# Patient Record
Sex: FEMALE | URBAN - METROPOLITAN AREA
[De-identification: names, ages, dates, MRNs, and addresses within clinical notes are randomized per-mention and may not be internally consistent; named-entity substitution may affect disease eponyms.]

---

## 2020-08-13 ENCOUNTER — OFFICE VISIT (OUTPATIENT)
Dept: NEUROLOGY | Facility: CLINIC | Age: 40
End: 2020-08-13
Payer: COMMERCIAL

## 2020-08-13 VITALS
RESPIRATION RATE: 16 BRPM | OXYGEN SATURATION: 99 % | DIASTOLIC BLOOD PRESSURE: 66 MMHG | BODY MASS INDEX: 23.3 KG/M2 | WEIGHT: 145 LBS | HEART RATE: 92 BPM | HEIGHT: 66 IN | SYSTOLIC BLOOD PRESSURE: 110 MMHG

## 2020-08-13 DIAGNOSIS — G43.009 MIGRAINE WITHOUT AURA AND WITHOUT STATUS MIGRAINOSUS, NOT INTRACTABLE: Primary | ICD-10-CM

## 2020-08-13 PROCEDURE — 99204 OFFICE O/P NEW MOD 45 MIN: CPT | Performed by: PSYCHIATRY & NEUROLOGY

## 2020-08-13 RX ORDER — AMITRIPTYLINE HYDROCHLORIDE 10 MG/1
10 TABLET, FILM COATED ORAL
Qty: 90 TAB | Refills: 1 | Status: SHIPPED | OUTPATIENT
Start: 2020-08-13 | End: 2021-02-02 | Stop reason: SDUPTHER

## 2020-08-13 NOTE — PROGRESS NOTES
Neurology Consult Note      HISTORY PROVIDED BY: patient    Chief Complaint:   Chief Complaint   Patient presents with    Headache     patient states they are not as bad over the last 2-3 days but it has been a persistent headache. this has been going on for 7-8 months. she states sometimes she may have a headache that may last up to 15 days and varies how intense it is.  Dizziness     patient states she has been having some dizziness but she hasnt had it 2-3 weeks but when it happens it feel like the room is spinning      Numbness     patient states she is having some numbness and tightness in her left hand this occurs every once in a while. Subjective:    Aaron Agarwal is a 42 y.o. right handed female who presents in consultation for headaches. Pt reports onset of migraines in high school, worse in last 5 years. More severe HAs have typically been at onset of periods, over the last 6-8 months she has had HAs that are lasting for two weeks. During these periods she has felt dizzy and off balance to the point she felt uncomfortable walking her dog. Pain is posterior occipital region, may occur behind eyes if she is on computer too long, throbbing, no N/V, +photophobia, blurry vision when severe and dizzy. Frequency varies, may go away for a month or only a few days. Taking OTC meds for abortive therapy. No prevention meds. Drinks 6-7 glasses of water a day. Drinks one cup of coffee a day. Exercises 3 days a week. +Family h/o migraines in Mom. Father with PD, stroke, 77yo. At night, she has had a feeling in her left leg that makes her have to get up and walk around the block, typically in left thigh, feels like it is jerking, but not visible. This has been off and on for a couple of years. Has not been treated for RLS. Left hand has felt numb and achy, happened a couple of times in last six months, not persistent and not associated with HA.      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   Social Needs    Financial resource strain: Not on file    Food insecurity     Worry: Not on file     Inability: Not on file    Transportation needs     Medical: Not on file     Non-medical: Not on file   Tobacco Use    Smoking status: Former Smoker     Packs/day: 0.25     Last attempt to quit: 2019     Years since quittin.6    Smokeless tobacco: Never Used   Substance and Sexual Activity    Alcohol use:  Yes     Alcohol/week: 2.0 - 3.0 standard drinks     Types: 2 - 3 Glasses of wine per week    Drug use: No    Sexual activity: Not on file   Lifestyle    Physical activity     Days per week: Not on file     Minutes per session: Not on file    Stress: Not on file   Relationships    Social connections     Talks on phone: Not on file     Gets together: Not on file     Attends Buddhist service: Not on file     Active member of club or organization: Not on file     Attends meetings of clubs or organizations: Not on file     Relationship status: Not on file    Intimate partner violence     Fear of current or ex partner: Not on file     Emotionally abused: Not on file     Physically abused: Not on file     Forced sexual activity: Not on file   Other Topics Concern    Not on file   Social History Narrative    Lives in [facility] alone     Family History   Problem Relation Age of Onset    Arthritis-rheumatoid Mother     Migraines Mother     Cancer Maternal Grandmother     Stroke Maternal Grandfather     Diabetes Maternal Grandfather     Heart Disease Paternal Grandmother     Heart Disease Paternal Grandfather     Parkinson's Disease Father     Stroke Father     Prostate Cancer Father     No Known Problems Brother     No Known Problems Brother          Objective:   Review of Systems   Constitutional: Positive for malaise/fatigue. HENT: Negative. Eyes: Positive for blurred vision. Respiratory: Negative. Cardiovascular: Negative. Gastrointestinal: Negative. Genitourinary: Negative. Musculoskeletal: Negative. Skin: Negative. Neurological: Positive for weakness and headaches. Endo/Heme/Allergies: Negative. Psychiatric/Behavioral: The patient is nervous/anxious. No Known Allergies     Meds:  Outpatient Medications Prior to Visit   Medication Sig Dispense Refill    ethynodiol-ethinyl estradiol (Ivanna Truong 1/35, 28,) 1-35 mg-mcg per tablet take 1 Tab by mouth daily.  alprazolam (XANAX) 0.5 mg tablet take 1 Tab by mouth nightly as needed for Sleep. 30 Tab 1     No facility-administered medications prior to visit. Imaging:  MRI Results (most recent):  No results found for this or any previous visit. CT Results (most recent):  No results found for this or any previous visit. Reviewed records in [employer] tab today    Lab Review   Results for orders placed or performed in visit on 07/11/09   CBC W/ AUTOMATED DIFF   Result Value Ref Range    WBC 9.2 3.6 - 11.0 K/uL    RBC 4.13 3.80 - 5.20 M/uL    HGB 13.5 11.5 - 16.0 g/dL    HCT 40.2 35.0 - 47.0 %    MCV 97.3 80.0 - 99.0 FL    MCH 32.7 26.0 - 34.0 PG    MCHC 33.6 30.0 - 35.0 g/dL    RDW 14.0 11.5 - 14.5 %    PLATELET 402 295 - 997 K/uL    NEUTROPHILS 71 32 - 75 %    LYMPHOCYTES 22 12 - 49 %    MONOCYTES 6 5 - 13 %    EOSINOPHILS 1 0 - 7 %    BASOPHILS 0 0 - 1 %    ABS. NEUTROPHILS 6.5 1.8 - 8.0 K/UL    ABS. LYMPHOCYTES 2.0 0.8 - 3.5 K/UL    ABS. MONOCYTES 0.6 0 - 1.0 K/UL    ABS. EOSINOPHILS 0.1 0.0 - 0.4 K/UL    ABS.  BASOPHILS 0.0 0.0 - 0.1 K/UL   IRON PROFILE   Result Value Ref Range    Iron 89 35 - 150 ug/dL    TIBC 411 250 - 450 ug/dL    Iron % saturation 22 20 - 50 %   METABOLIC PANEL, COMPREHENSIVE   Result Value Ref Range    Sodium 134 (L) 136 - 145 MMOL/L    Potassium 3.9 3.5 - 5.1 MMOL/L    Chloride 101 97 - 108 MMOL/L    CO2 26 21 - 32 MMOL/L    Anion gap 7 5 - 15 mmol/L    Glucose 83 50 - 100 MG/DL    BUN 13 6 - 20 MG/DL    Creatinine 0.7 0.6 - 1.3 MG/DL    BUN/Creatinine ratio 19 12 - 20      GFR est AA >60 >60 ml/min/1.73m2    GFR est non-AA >60 >60 ml/min/1.73m2    Calcium 9.1 8.5 - 10.1 MG/DL    Bilirubin, total 0.3 <1.0 MG/DL    ALT (SGPT) 27 (L) 30 - 65 U/L    AST (SGOT) 16 15 - 37 U/L    Alk. phosphatase 68 50 - 136 U/L    Protein, total 7.3 6.4 - 8.2 g/dL    Albumin 4.0 3.5 - 5.0 g/dL    Globulin 3.3 2.0 - 4.0 g/dL    A-G Ratio 1.2 1.1 - 2.2          Exam:  Visit Vitals  /66 (BP 1 Location: Left arm, BP Patient Position: Sitting)   Pulse 92   Resp 16   Ht 5' 6\" (1.676 m)   Wt 65.8 kg (145 lb)   SpO2 99%   BMI 23.40 kg/m²     General:  Alert, cooperative, no distress. Head:  Normocephalic, without obvious abnormality, atraumatic. Respiratory:  Heart:   Non labored breathing  Regular rate and rhythm, no murmurs   Neck:   2+ carotids, no bruits   Extremities: Warm, no cyanosis or edema. Pulses: 2+ radial pulses. Neurologic:  MS: Alert and oriented x 4, speech intact. Language intact. Attention and fund of knowledge appropriate. Recent and remote memory intact.   Cranial Nerves:  II: visual fields Full to confrontation   II: pupils Equal, round, reactive to light   II: optic disc    III,VII: ptosis none   III,IV,VI: extraocular muscles  EOMI, no nystagmus or diplopia   V: facial light touch sensation  normal   VII: facial muscle function   symmetric   VIII: hearing intact   IX: soft palate elevation  normal   XI: trapezius strength  5/5   XI: sternocleidomastoid strength 5/5   XII: tongue  Midline     Motor: normal bulk and tone, no tremor              Strength: 5/5 throughout, no PD  Sensory: intact to LT, PP  Coordination: FTN and HTS intact, VIDHI intact  Gait: normal gait, able to tandem walk  Reflexes: 2+ symmetric, toes downgoing     Assessment/Plan   Pt is a 42 y.o. right handed female with onset of migraines in high school, worse in last 5 years. More severe HAs have typically been at onset of periods, over the last 6-8 months HAs are lasting for two weeks, vary in frequency, and associated with dizziness and imbalance. Throbbing pain in posterior occipital region, may occur behind eyes, +photophobia, blurry vision when severe. Taking OTC meds for abortive therapy. Additionally, c/o feeling in her left leg causing her to be unable to sleep and feels the urge to move it. Exam is non-focal and unremarkable. Agree with diagnosis of migraine headache without aura. Recommend starting a MHA prevention medication, amitriptyline 10mg qhs, side effects reviewed. Continue OTC meds for abortive therapy for now. Discussed possible RLS, will see if sleep improves with amitriptyline, may need to start medication for RLS if not. Encouraged to increase water intake to 8 glasses a day. F/u in clinic in 3 months, instructed to call in the interim if needed. ICD-10-CM ICD-9-CM    1. Migraine without aura and without status migrainosus, not intractable  G43.009 346.10        Signed:   Robert Browne MD  8/13/2020

## 2020-08-13 NOTE — PROGRESS NOTES
patient states they are not as bad over the last 2-3 days but it has been a persistent headache. this has been going on for 7-8 months. she states sometimes she may have a headache that may last up to 15 days and varies how intense it is.patient states she has been having some dizziness but she hasnt had it 2-3 weeks but when it happens it feel like the room is spinning. patient states she is having some numbness and tightness in her left hand this occurs every once in a while. .  Chief Complaint   Patient presents with    Headache     patient states they are not as bad over the last 2-3 days but it has been a persistent headache. this has been going on for 7-8 months. she states sometimes she may have a headache that may last up to 15 days and varies how intense it is.  Dizziness     patient states she has been having some dizziness but she hasnt had it 2-3 weeks but when it happens it feel like the room is spinning      Numbness     patient states she is having some numbness and tightness in her left hand this occurs every once in a while.      .  Visit Vitals  /66 (BP 1 Location: Left arm, BP Patient Position: Sitting)   Pulse 92   Resp 16   Ht 5' 6\" (1.676 m)   Wt 145 lb (65.8 kg)   SpO2 99%   BMI 23.40 kg/m²

## 2020-11-17 ENCOUNTER — TELEPHONE (OUTPATIENT)
Dept: NEUROLOGY | Age: 40
End: 2020-11-17

## 2020-11-17 NOTE — TELEPHONE ENCOUNTER
Pt calling back to r/s appt for 11/19. Please advise on where to schedule.  Next available is in January

## 2020-11-19 ENCOUNTER — TELEPHONE (OUTPATIENT)
Dept: NEUROLOGY | Age: 40
End: 2020-11-19

## 2021-02-02 ENCOUNTER — OFFICE VISIT (OUTPATIENT)
Dept: NEUROLOGY | Age: 41
End: 2021-02-02
Payer: COMMERCIAL

## 2021-02-02 VITALS
OXYGEN SATURATION: 98 % | BODY MASS INDEX: 23.08 KG/M2 | RESPIRATION RATE: 18 BRPM | HEART RATE: 117 BPM | WEIGHT: 143 LBS

## 2021-02-02 DIAGNOSIS — G43.009 MIGRAINE WITHOUT AURA AND WITHOUT STATUS MIGRAINOSUS, NOT INTRACTABLE: Primary | ICD-10-CM

## 2021-02-02 PROCEDURE — 99213 OFFICE O/P EST LOW 20 MIN: CPT | Performed by: PSYCHIATRY & NEUROLOGY

## 2021-02-02 RX ORDER — AMITRIPTYLINE HYDROCHLORIDE 10 MG/1
20 TABLET, FILM COATED ORAL
Qty: 180 TAB | Refills: 1 | Status: SHIPPED | OUTPATIENT
Start: 2021-02-02 | End: 2021-08-10

## 2021-02-02 RX ORDER — ONDANSETRON 4 MG/1
4 TABLET, ORALLY DISINTEGRATING ORAL
Qty: 30 TAB | Refills: 0 | Status: SHIPPED | OUTPATIENT
Start: 2021-02-02

## 2021-02-02 RX ORDER — RIZATRIPTAN BENZOATE 10 MG/1
TABLET, ORALLY DISINTEGRATING ORAL
Qty: 27 TAB | Refills: 3 | Status: SHIPPED | OUTPATIENT
Start: 2021-02-02

## 2021-02-02 NOTE — PROGRESS NOTES
Neurology Consult Note      HISTORY PROVIDED BY: patient    Chief Complaint:   Chief Complaint   Patient presents with    Migraine      Subjective:   Pt is a 42 y.o. right handed female initially and last seen on 8/13/20 with onset of migraines in high school, worse in last 5 years. More severe HAs have typically been at onset of periods, over the last 6-8 months HAs are lasting for two weeks, vary in frequency, and associated with dizziness and imbalance. Throbbing pain in posterior occipital region, may occur behind eyes, +photophobia, blurry vision when severe. Taking OTC meds for abortive therapy. Additionally, c/o feeling in her left leg causing her to be unable to sleep and feels the urge to move it. Exam was non-focal and unremarkable. Agree with diagnosis of migraine headache without aura. Recommended starting a MHA prevention medication, amitriptyline 10mg qhs. Continued OTC meds for abortive therapy for now. Discussed possible RLS, will see if sleep improves with amitriptyline, may need to start medication for RLS if not. Encouraged to increase water intake to 8 glasses a day. She returns for f/u. Her RLS have been almost completely resolved. MHAs are better, occurring once a month, lasting for a shorter time, typically 2.5 days. Last one was 3.5 weeks ago and lasted 4 days with N/V. She is using ibuprofen for abortive therapy, but not helping. She tried Imitrex in past, but had side effect of her chest tightening.       Past Medical History:   Diagnosis Date    Anxiety     Migraine     monthly w/ cycle      Past Surgical History:   Procedure Laterality Date    HX WISDOM TEETH EXTRACTION        Social History     Socioeconomic History    Marital status: SINGLE     Spouse name: Not on file    Number of children: Not on file    Years of education: Not on file    Highest education level: Not on file   Occupational History    Occupation:  for [address] Financial resource strain: Not on file    Food insecurity     Worry: Not on file     Inability: Not on file    Transportation needs     Medical: Not on file     Non-medical: Not on file   Tobacco Use    Smoking status: Former Smoker     Packs/day: 0.25     Quit date:      Years since quittin.0    Smokeless tobacco: Never Used   Substance and Sexual Activity    Alcohol use: Yes     Alcohol/week: 2.0 - 3.0 standard drinks     Types: 2 - 3 Glasses of wine per week    Drug use: No    Sexual activity: Not on file   Lifestyle    Physical activity     Days per week: Not on file     Minutes per session: Not on file    Stress: Not on file   Relationships    Social connections     Talks on phone: Not on file     Gets together: Not on file     Attends Hindu service: Not on file     Active member of club or organization: Not on file     Attends meetings of clubs or organizations: Not on file     Relationship status: Not on file    Intimate partner violence     Fear of current or ex partner: Not on file     Emotionally abused: Not on file     Physically abused: Not on file     Forced sexual activity: Not on file   Other Topics Concern    Not on file   Social History Narrative    Lives in [facility] alone     Family History   Problem Relation Age of Onset    Arthritis-rheumatoid Mother     Migraines Mother     Cancer Maternal Grandmother     Stroke Maternal Grandfather     Diabetes Maternal Grandfather     Heart Disease Paternal Grandmother     Heart Disease Paternal Grandfather     Parkinson's Disease Father     Stroke Father     Prostate Cancer Father     No Known Problems Brother     No Known Problems Brother          Objective:   ROS: Per HPI o/w reviewed and neg    No Known Allergies     Meds:  Outpatient Medications Prior to Visit   Medication Sig Dispense Refill    amitriptyline (ELAVIL) 10 mg tablet Take 1 Tab by mouth nightly.  90 Tab 1    ethynodiol-ethinyl estradiol (Kane Shows 1/35, 28,) 1-35 mg-mcg per tablet take 1 Tab by mouth daily.  alprazolam (XANAX) 0.5 mg tablet take 1 Tab by mouth nightly as needed for Sleep. 30 Tab 1     No facility-administered medications prior to visit. Imaging:  MRI Results (most recent):  No results found for this or any previous visit. CT Results (most recent):  No results found for this or any previous visit. Reviewed records in [employer] tab today    Lab Review   Results for orders placed or performed in visit on 07/11/09   CBC W/ AUTOMATED DIFF   Result Value Ref Range    WBC 9.2 3.6 - 11.0 K/uL    RBC 4.13 3.80 - 5.20 M/uL    HGB 13.5 11.5 - 16.0 g/dL    HCT 40.2 35.0 - 47.0 %    MCV 97.3 80.0 - 99.0 FL    MCH 32.7 26.0 - 34.0 PG    MCHC 33.6 30.0 - 35.0 g/dL    RDW 14.0 11.5 - 14.5 %    PLATELET 077 197 - 812 K/uL    NEUTROPHILS 71 32 - 75 %    LYMPHOCYTES 22 12 - 49 %    MONOCYTES 6 5 - 13 %    EOSINOPHILS 1 0 - 7 %    BASOPHILS 0 0 - 1 %    ABS. NEUTROPHILS 6.5 1.8 - 8.0 K/UL    ABS. LYMPHOCYTES 2.0 0.8 - 3.5 K/UL    ABS. MONOCYTES 0.6 0 - 1.0 K/UL    ABS. EOSINOPHILS 0.1 0.0 - 0.4 K/UL    ABS. BASOPHILS 0.0 0.0 - 0.1 K/UL   IRON PROFILE   Result Value Ref Range    Iron 89 35 - 150 ug/dL    TIBC 411 250 - 450 ug/dL    Iron % saturation 22 20 - 50 %   METABOLIC PANEL, COMPREHENSIVE   Result Value Ref Range    Sodium 134 (L) 136 - 145 MMOL/L    Potassium 3.9 3.5 - 5.1 MMOL/L    Chloride 101 97 - 108 MMOL/L    CO2 26 21 - 32 MMOL/L    Anion gap 7 5 - 15 mmol/L    Glucose 83 50 - 100 MG/DL    BUN 13 6 - 20 MG/DL    Creatinine 0.7 0.6 - 1.3 MG/DL    BUN/Creatinine ratio 19 12 - 20      GFR est AA >60 >60 ml/min/1.73m2    GFR est non-AA >60 >60 ml/min/1.73m2    Calcium 9.1 8.5 - 10.1 MG/DL    Bilirubin, total 0.3 <1.0 MG/DL    ALT (SGPT) 27 (L) 30 - 65 U/L    AST (SGOT) 16 15 - 37 U/L    Alk.  phosphatase 68 50 - 136 U/L    Protein, total 7.3 6.4 - 8.2 g/dL    Albumin 4.0 3.5 - 5.0 g/dL    Globulin 3.3 2.0 - 4.0 g/dL    A-G Ratio 1.2 1.1 - 2.2          Exam:  Visit Vitals  Pulse (!) 117   Resp 18   Wt 143 lb (64.9 kg)   SpO2 98%   BMI 23.08 kg/m²     General:  Alert, cooperative, no distress. Head:  Normocephalic, without obvious abnormality, atraumatic. Respiratory:  Heart:   Non labored breathing  Regular rate and rhythm, no murmurs   Neck:      Extremities: Warm, no cyanosis or edema. Pulses: 2+ radial pulses. Neurologic:  MS: Alert and oriented x 4, speech intact. Language intact. Attention and fund of knowledge appropriate. Recent and remote memory intact. Exam 8/13/20:  Cranial Nerves:  II: visual fields Full to confrontation   II: pupils Equal, round, reactive to light   II: optic disc    III,VII: ptosis none   III,IV,VI: extraocular muscles  EOMI, no nystagmus or diplopia   V: facial light touch sensation  normal   VII: facial muscle function   symmetric   VIII: hearing intact   IX: soft palate elevation  normal   XI: trapezius strength  5/5   XI: sternocleidomastoid strength 5/5   XII: tongue  Midline     Motor: normal bulk and tone, no tremor              Strength: 5/5 throughout, no PD  Sensory: intact to LT, PP  Coordination: FTN and HTS intact, VIDHI intact  Gait: normal gait, able to tandem walk  Reflexes: 2+ symmetric, toes downgoing     Assessment/Plan   Pt is a 42 y.o. right handed female with onset of migraine without aura in high school, presenting in Aug, 2020 with c/o worsening in last 5 years. More severe HAs have typically been at onset of periods, over the last 6-8 months HAs are lasting for two weeks, vary in frequency, and associated with dizziness and imbalance. Throbbing pain in posterior occipital region, may occur behind eyes, +photophobia, blurry vision when severe. Now still once a month, but lasting only 2-3 days typically, not responding to ibuprofen. Additionally, c/o feeling in her left leg causing her to be unable to sleep and feels the urge to move it, suggestive of RLS, now improved. Exam was non-focal and unremarkable. Increase  amitriptyline to 20mg qhs. Start Maxalt MLT for abortive therapy. Start Zofran 4mg for nausea. Suggested calling for a medrol dose pack if HA is lasting into the 3rd day. F/u in clinic in 6 months, instructed to call in the interim if needed. ICD-10-CM ICD-9-CM    1. Migraine without aura and without status migrainosus, not intractable  G43.009 346.10        Signed:   Joey Naranjo MD  2/2/2021

## 2021-02-02 NOTE — PATIENT INSTRUCTIONS
PRESCRIPTION REFILL POLICY [facility] Statement to Patients April 1, 2014 In an effort to ensure the large volume of patient prescription refills is processed in the most efficient and expeditious manner, we are asking our patients to assist us by calling your Pharmacy for all prescription refills, this will include also your  Mail Order Pharmacy. The pharmacy will contact our office electronically to continue the refill process. Please do not wait until the last minute to call your pharmacy. We need at least 48 hours (2days) to fill prescriptions. We also encourage you to call your pharmacy before going to  your prescription to make sure it is ready. With regard to controlled substance prescription refill requests (narcotic refills) that need to be picked up at our office, we ask your cooperation by providing us with at least 72 hours (3days) notice that you will need a refill. We will not refill narcotic prescription refill requests after 4:00pm on any weekday, Monday through Thursday, or after 2:00pm on Fridays, or on the weekends. We encourage everyone to explore another way of getting your prescription refill request processed using [employer], our patient web portal through our electronic medical record system. [employer] is an efficient and effective way to communicate your medication request directly to the office and  downloadable as an elkin on your smart phone . [employer] also features a review functionality that allows you to view your medication list as well as leave messages for your physician. Are you ready to get connected? If so please review the attatched instructions or speak to any of our staff to get you set up right away! Thank you so much for your cooperation. Should you have any questions please contact our Practice Administrator. The Physicians and Staff,  [facility]

## 2021-02-02 NOTE — LETTER
2/2/2021 Patient: Talat Ricks YOB: 1980 Date of Visit: 2/2/2021 Shavonne Jordan MD 
[facility] [address] [id number] Via Fax: [phone] Dear Shavonne Jordan MD, Thank you for referring Ms. Talat Ricks to [address] [address] for evaluation. My notes for this consultation are attached. If you have questions, please do not hesitate to call me. I look forward to following your patient along with you. Sincerely, Yara Ramírez MD

## 2021-08-10 RX ORDER — AMITRIPTYLINE HYDROCHLORIDE 10 MG/1
TABLET, FILM COATED ORAL
Qty: 180 TABLET | Refills: 0 | Status: SHIPPED | OUTPATIENT
Start: 2021-08-10 | End: 2021-10-31

## 2021-10-31 RX ORDER — AMITRIPTYLINE HYDROCHLORIDE 10 MG/1
TABLET, FILM COATED ORAL
Qty: 180 TABLET | Refills: 1 | Status: SHIPPED | OUTPATIENT
Start: 2021-10-31 | End: 2022-01-20 | Stop reason: SINTOL

## 2022-01-20 ENCOUNTER — OFFICE VISIT (OUTPATIENT)
Dept: NEUROLOGY | Age: 42
End: 2022-01-20
Payer: COMMERCIAL

## 2022-01-20 VITALS
SYSTOLIC BLOOD PRESSURE: 120 MMHG | WEIGHT: 142 LBS | OXYGEN SATURATION: 99 % | DIASTOLIC BLOOD PRESSURE: 80 MMHG | HEIGHT: 66 IN | HEART RATE: 146 BPM | BODY MASS INDEX: 22.82 KG/M2

## 2022-01-20 DIAGNOSIS — G43.009 MIGRAINE WITHOUT AURA AND WITHOUT STATUS MIGRAINOSUS, NOT INTRACTABLE: Primary | ICD-10-CM

## 2022-01-20 DIAGNOSIS — G25.81 RLS (RESTLESS LEGS SYNDROME): ICD-10-CM

## 2022-01-20 PROCEDURE — 99214 OFFICE O/P EST MOD 30 MIN: CPT | Performed by: PSYCHIATRY & NEUROLOGY

## 2022-01-20 RX ORDER — GABAPENTIN 300 MG/1
300 CAPSULE ORAL
Qty: 90 CAPSULE | Refills: 1 | Status: SHIPPED | OUTPATIENT
Start: 2022-01-20 | End: 2022-07-18

## 2022-01-20 NOTE — LETTER
2/7/2022    Patient: Annette Keating   YOB: 1980   Date of Visit: 1/20/2022     Katina Medina MD  [address] [place] [id number]  Via Fax: [phone]    Dear Katina Medina MD,      Thank you for referring Ms. Annette Keating to [address] [place] [address] for evaluation. My notes for this consultation are attached. If you have questions, please do not hesitate to call me. I look forward to following your patient along with you.       Sincerely,    Greg Noonan MD

## 2022-01-20 NOTE — PROGRESS NOTES
Neurology Consult Note      HISTORY PROVIDED BY: patient    Chief Complaint:   Chief Complaint   Patient presents with    Follow-up    Migraine     RLS      Subjective:   Pt is a 46 y.o. right handed female last seen in clinic on 2/2/21 in f/u, she had onset of migraine without aura in high school, presenting in Aug, 2020 with c/o worsening in previous 5 years. More severe HAs have typically been at onset of periods, over the previous 6-8 months HAs were lasting for two weeks, vary in frequency, and associated with dizziness and imbalance. Throbbing pain in posterior occipital region, may occur behind eyes, +photophobia, blurry vision when severe. Once a month at last visit, but lasting 2-3 days typically, not responding to ibuprofen. Additionally, c/o feeling in her left leg causing her to be unable to sleep and feels the urge to move it, suggestive of RLS, improved. Exam was non-focal and unremarkable. Increased  amitriptyline to 20mg qhs. Started Maxalt MLT for abortive therapy. Started Zofran 4mg for nausea. Suggested calling for a medrol dose pack if HA is lasting into the 3rd day. She returns for f/u. Her MHAs are well controlled since increasing amitriptyline. She has only used Maxalt x 2. Her RLS will bother her for 3-4 nights in row, then may not bother her for a week or two. No particular pattern. Her PCP has diagnosed her with CTS, but she has also noticed tingling feeling in right foot, but has resolved. No numbness.      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   Tobacco Use    Smoking status: Former Smoker     Packs/day: 0.25     Quit date: 2019     Years since quitting: 3.0    Smokeless tobacco: Never Used   Substance and Sexual Activity    Alcohol use: Yes     Alcohol/week: 2.0 - 3.0 standard drinks     Types: 2 - 3 Glasses of wine per week    Drug use: No    Sexual activity: Not on file   Other Topics Concern    Not on file   Social History Narrative    Lives in [place]     Social Determinants of Health     Financial Resource Strain:     Difficulty of Paying Living Expenses: Not on file   Food Insecurity:     Worried About Running Out of Food in the Last Year: Not on file    Howie of Food in the Last Year: Not on file   Transportation Needs:     Lack of Transportation (Medical): Not on file    Lack of Transportation (Non-Medical):  Not on file   Physical Activity:     Days of Exercise per Week: Not on file    Minutes of Exercise per Session: Not on file   Stress:     Feeling of Stress : Not on file   Social Connections:     Frequency of Communication with Friends and Family: Not on file    Frequency of Social Gatherings with Friends and Family: Not on file    Attends [employer] Services: Not on file    Active Member of [address] [place] [address] [employer] or Organizations: Not on file    Attends Club or Organization Meetings: Not on file    Marital Status: Not on file   Intimate Partner Violence:     Fear of Current or Ex-Partner: Not on file    Emotionally Abused: Not on file    Physically Abused: Not on file    Sexually Abused: Not on file   Housing Stability:     Unable to Pay for Housing in the Last Year: Not on file    Number of [place] in the Last Year: Not on file    Unstable Housing in the Last Year: Not on file     Family History   Problem Relation Age of Onset    Arthritis-rheumatoid Mother     Migraines Mother     Cancer Maternal Grandmother     Stroke Maternal Grandfather     Diabetes Maternal Grandfather     Heart Disease Paternal Grandmother    [facility] Heart Disease Paternal Grandfather     Parkinson's Disease Father     Stroke Father     Prostate Cancer Father     No Known Problems Brother     No Known Problems Brother          Objective:   ROS: Per HPI o/w reviewed and neg    No Known Allergies     Meds:  Outpatient Medications Prior to Visit   Medication Sig Dispense Refill    amitriptyline (ELAVIL) 10 mg tablet TAKE 2 TABLETS BY MOUTH EVERY NIGHT 180 Tablet 1    rizatriptan (MAXALT-MLT) 10 mg disintegrating tablet May take 1 tab by mouth at onset of migraine, may repeat x 1 after 2 hours if needed for ongoing headache, max 2 in 24 hours 27 Tab 3    ethynodiol-ethinyl estradiol ([facility] 1/35, 28,) 1-35 mg-mcg per tablet take 1 Tab by mouth daily.  alprazolam (XANAX) 0.5 mg tablet take 1 Tab by mouth nightly as needed for Sleep. 30 Tab 1    ondansetron (ZOFRAN ODT) 4 mg disintegrating tablet Take 1 Tab by mouth every eight (8) hours as needed for Nausea or Vomiting. (Patient not taking: Reported on 1/20/2022) 30 Tab 0     No facility-administered medications prior to visit. Imaging:  MRI Results (most recent):  No results found for this or any previous visit. CT Results (most recent):  No results found for this or any previous visit. Reviewed records in [facility] and [facility] [facility] [employer] tab today    Lab Review   Results for orders placed or performed in visit on 07/11/09   CBC W/ AUTOMATED DIFF   Result Value Ref Range    WBC 9.2 3.6 - 11.0 K/uL    RBC 4.13 3.80 - 5.20 M/uL    HGB 13.5 11.5 - 16.0 g/dL    HCT 40.2 35.0 - 47.0 %    MCV 97.3 80.0 - 99.0 FL    MCH 32.7 26.0 - 34.0 PG    MCHC 33.6 30.0 - 35.0 g/dL    RDW 14.0 11.5 - 14.5 %    PLATELET 261 119 - 148 K/uL    NEUTROPHILS 71 32 - 75 %    LYMPHOCYTES 22 12 - 49 %    MONOCYTES 6 5 - 13 %    EOSINOPHILS 1 0 - 7 %    BASOPHILS 0 0 - 1 %    ABS. NEUTROPHILS 6.5 1.8 - 8.0 K/UL    ABS. LYMPHOCYTES 2.0 0.8 - 3.5 K/UL    ABS. MONOCYTES 0.6 0 - 1.0 K/UL    ABS.  EOSINOPHILS 0.1 0.0 - 0.4 K/UL ABS. BASOPHILS 0.0 0.0 - 0.1 K/UL   IRON PROFILE   Result Value Ref Range    Iron 89 35 - 150 ug/dL    TIBC 411 250 - 450 ug/dL    Iron % saturation 22 20 - 50 %   METABOLIC PANEL, COMPREHENSIVE   Result Value Ref Range    Sodium 134 (L) 136 - 145 MMOL/L    Potassium 3.9 3.5 - 5.1 MMOL/L    Chloride 101 97 - 108 MMOL/L    CO2 26 21 - 32 MMOL/L    Anion gap 7 5 - 15 mmol/L    Glucose 83 50 - 100 MG/DL    BUN 13 6 - 20 MG/DL    Creatinine 0.7 0.6 - 1.3 MG/DL    BUN/Creatinine ratio 19 12 - 20      GFR est AA >60 >60 ml/min/1.73m2    GFR est non-AA >60 >60 ml/min/1.73m2    Calcium 9.1 8.5 - 10.1 MG/DL    Bilirubin, total 0.3 <1.0 MG/DL    ALT (SGPT) 27 (L) 30 - 65 U/L    AST (SGOT) 16 15 - 37 U/L    Alk. phosphatase 68 50 - 136 U/L    Protein, total 7.3 6.4 - 8.2 g/dL    Albumin 4.0 3.5 - 5.0 g/dL    Globulin 3.3 2.0 - 4.0 g/dL    A-G Ratio 1.2 1.1 - 2.2          Exam:  Visit Vitals  /80   Pulse (!) 146   Ht 5' 6\" (1.676 m)   Wt 142 lb (64.4 kg)   SpO2 99%   BMI 22.92 kg/m²     General:  Alert, cooperative, no distress. Head:  Normocephalic, without obvious abnormality, atraumatic. Respiratory:  Heart:   Non labored breathing  Regular rate and rhythm, no murmurs   Neck:      Extremities: Warm, no cyanosis or edema. Pulses: 2+ radial pulses. Neurologic:  MS: Alert and oriented x 4, speech intact. Language intact. Attention and fund of knowledge appropriate. Recent and remote memory intact.     Cranial Nerves:  II: visual fields    II: pupils    II: optic disc    III,VII: ptosis none   III,IV,VI: extraocular muscles  EOMI, no nystagmus or diplopia   V: facial light touch sensation     VII: facial muscle function   symmetric   VIII: hearing intact   IX: soft palate elevation     XI: trapezius strength     XI: sternocleidomastoid strength    XII: tongue       Motor: normal bulk and tone, no tremor              Strength: 5/5 throughout, no PD  Sensory: (At previous OV: intact to LT, PP)  Coordination: FTN and HTS intact, VIDHI intact  Gait: normal gait  Reflexes: (At previous OV: 2+ symmetric, toes downgoing)     Assessment/Plan   Pt is a 46 y.o. right handed female with onset of migraine without aura in high school, presenting in Aug, 2020 with c/o worsening in previous 5 years. More severe HAs have typically been at onset of periods, over the previous 6-8 months HAs were lasting for two weeks, vary in frequency, and associated with dizziness and imbalance. Throbbing pain in posterior occipital region, may occur behind eyes, +photophobia, blurry vision when severe. MHAs are well controlled on amitriptyline 20mg qhs, taking Maxalt for abortive therapy. Additionally, c/o feeling in her left leg causing her to be unable to sleep and feels the urge to move it, suggestive of RLS, still occurring intermttently. Exam is non-focal and unremarkable. Continue amitriptyline to 20mg qhs. Continue Maxalt MLT for abortive therapy. Continue Zofran 4mg for nausea. Start gabapentin 300mg qhs for RLS, side effects reviewed. F/u in clinic in 4 months, instructed to call in the interim if needed. ICD-10-CM ICD-9-CM    1. Migraine without aura and without status migrainosus, not intractable  G43.009 346.10    2. RLS (restless legs syndrome)  G25.81 333.94 gabapentin (NEURONTIN) 300 mg capsule       Signed:   Key Joe MD  1/20/2022

## 2022-07-17 DIAGNOSIS — G25.81 RLS (RESTLESS LEGS SYNDROME): ICD-10-CM

## 2022-07-18 RX ORDER — GABAPENTIN 300 MG/1
CAPSULE ORAL
Qty: 90 CAPSULE | Refills: 0 | Status: SHIPPED | OUTPATIENT
Start: 2022-07-18 | End: 2022-10-19

## 2022-10-17 DIAGNOSIS — G25.81 RLS (RESTLESS LEGS SYNDROME): ICD-10-CM

## 2022-10-19 RX ORDER — GABAPENTIN 300 MG/1
CAPSULE ORAL
Qty: 90 CAPSULE | Refills: 1 | Status: SHIPPED | OUTPATIENT
Start: 2022-10-19

## 2022-10-19 NOTE — TELEPHONE ENCOUNTER
Pt no showed her fu appt in June, 2021. She has a fu with Dr. Noble Velarde in April, 2023. Last gabapentin refill without being seen.

## 2023-04-05 ENCOUNTER — OFFICE VISIT (OUTPATIENT)
Dept: NEUROLOGY | Age: 43
End: 2023-04-05
Payer: COMMERCIAL

## 2023-04-05 PROCEDURE — 99214 OFFICE O/P EST MOD 30 MIN: CPT | Performed by: PSYCHIATRY & NEUROLOGY

## 2023-04-05 RX ORDER — RIZATRIPTAN BENZOATE 10 MG/1
10 TABLET ORAL
Qty: 9 TABLET | Refills: 2 | Status: SHIPPED
Start: 2023-04-05 | End: 2023-04-05

## 2023-04-05 RX ORDER — GABAPENTIN 300 MG/1
600 CAPSULE ORAL
Qty: 180 CAPSULE | Refills: 1 | Status: SHIPPED
Start: 2023-04-05

## 2023-04-05 RX ORDER — FLUTICASONE PROPIONATE AND SALMETEROL 250; 50 UG/1; UG/1
POWDER RESPIRATORY (INHALATION)
Start: 2023-03-23

## 2023-04-05 NOTE — PROGRESS NOTES
Neurology Clinic Follow up Note    Patient ID:  Arlette Tucker  [id number]  38 y.o.  1980      Ms. Wellington is here for follow up today of  Chief Complaint   Patient presents with    Other     Migraines and RLS  , Migraines are much better on gabapentin  and RLS not improved at least 1 or 2 nights in a row then a week later will happen again. Former Pt of Christopher Dixon          Last Appointment With Me:  None, former pt Dr. Christopher Dixon    \"Pt is a right handed female last seen in clinic on 2/2/21 in f/u, she had onset of migraine without aura in high school, presenting in Aug, 2020 with c/o worsening in previous 5 years. More severe HAs have typically been at onset of periods, over the previous 6-8 months HAs were lasting for two weeks, vary in frequency, and associated with dizziness and imbalance. Throbbing pain in posterior occipital region, may occur behind eyes, +photophobia, blurry vision when severe. Once a month at last visit, but lasting 2-3 days typically, not responding to ibuprofen. Additionally, c/o feeling in her left leg causing her to be unable to sleep and feels the urge to move it, suggestive of RLS, improved. Exam was non-focal and unremarkable. Increased  amitriptyline to 20mg qhs. Started Maxalt MLT for abortive therapy. Started Zofran 4mg for nausea. Suggested calling for a medrol dose pack if HA is lasting into the 3rd day. She returns for f/u. Her MHAs are well controlled since increasing amitriptyline. She has only used Maxalt x 2. Her RLS will bother her for 3-4 nights in row, then may not bother her for a week or two. No particular pattern. Her PCP has diagnosed her with CTS, but she has also noticed tingling feeling in right foot, but has resolved. No numbness. \"  Interval History:   Patient last seen over 1 year ago for her migraines and RLS. She reports onset of migraines since her teenage years.  She was maintained on amitriptyline previously but did not note significant change in her headaches. She is also taking gabapentin for RLS symptoms and reports this has also helped with her migraines. No significant migraines in 4 months, however she is still having 1-2 nights of difficulty sleeping due to RLS symptoms. Denies paresthesias, LE weakness. When she does experience a headache, these are typically located bi-temporal with nausea, photophobia, dizziness, no associated aura or focal neurologic deficits. These previously responded to Maxalt PRN. PMHx/ PSHx/ FHx/ SHx:  Reviewed and unchanged previous visit. Past Medical History:   Diagnosis Date    Anxiety     Migraine     monthly w/ cycle         ROS:  Comprehensive review of systems negative except for as noted above. Objective:       Meds:  Current Outpatient Medications   Medication Sig Dispense Refill    Wixela Inhub 250-50 mcg/dose diskus inhaler INHALE 1 PUFFS BY MOUTH TWO TIMES DAILY      gabapentin (NEURONTIN) 300 mg capsule TAKE 1 CAPSULE BY MOUTH EVERY NIGHT. MAX DAILY AMOUNT: 300 MG 90 Capsule 1    alprazolam (XANAX) 0.5 mg tablet take 1 Tab by mouth nightly as needed for Sleep. 30 Tab 1       Exam:  Visit Vitals  /78   Pulse 88   Ht 5' 6\" (1.676 m)   Wt 147 lb (66.7 kg)   SpO2 96%   BMI 23.73 kg/m²     NEUROLOGICAL EXAM:  General: Awake, alert, speech fluent  CN: PERRL, EOMI without nystagmus, VFF to confrontation, facial sensation and strength are normal and symmetric, hearing is intact to finger rub bilaterally, palate and tongue movements are intact and symmetric. Motor: Normal tone, bulk and strength bilaterally. Reflexes: 2/4 and symmetric, plantar stimulation is flexor. Coordination: FNF, VIDHI, HTS intact. Sensation: LT intact throughout. Gait: Normal-based and steady.     LABS  Results for orders placed or performed in visit on 07/11/09   CBC W/ AUTOMATED DIFF   Result Value Ref Range    WBC 9.2 3.6 - 11.0 K/uL    RBC 4.13 3.80 - 5.20 M/uL    HGB 13.5 11.5 - 16.0 g/dL    HCT 40.2 35.0 - 47.0 %    MCV 97.3 80.0 - 99.0 FL    MCH 32.7 26.0 - 34.0 PG    MCHC 33.6 30.0 - 35.0 g/dL    RDW 14.0 11.5 - 14.5 %    PLATELET 680 730 - 316 K/uL    NEUTROPHILS 71 32 - 75 %    LYMPHOCYTES 22 12 - 49 %    MONOCYTES 6 5 - 13 %    EOSINOPHILS 1 0 - 7 %    BASOPHILS 0 0 - 1 %    ABS. NEUTROPHILS 6.5 1.8 - 8.0 K/UL    ABS. LYMPHOCYTES 2.0 0.8 - 3.5 K/UL    ABS. MONOCYTES 0.6 0 - 1.0 K/UL    ABS. EOSINOPHILS 0.1 0.0 - 0.4 K/UL    ABS. BASOPHILS 0.0 0.0 - 0.1 K/UL   IRON PROFILE   Result Value Ref Range    Iron 89 35 - 150 ug/dL    TIBC 411 250 - 450 ug/dL    Iron % saturation 22 20 - 50 %   METABOLIC PANEL, COMPREHENSIVE   Result Value Ref Range    Sodium 134 (L) 136 - 145 MMOL/L    Potassium 3.9 3.5 - 5.1 MMOL/L    Chloride 101 97 - 108 MMOL/L    CO2 26 21 - 32 MMOL/L    Anion gap 7 5 - 15 mmol/L    Glucose 83 50 - 100 MG/DL    BUN 13 6 - 20 MG/DL    Creatinine 0.7 0.6 - 1.3 MG/DL    BUN/Creatinine ratio 19 12 - 20      GFR est AA >60 >60 ml/min/1.73m2    GFR est non-AA >60 >60 ml/min/1.73m2    Calcium 9.1 8.5 - 10.1 MG/DL    Bilirubin, total 0.3 <1.0 MG/DL    ALT (SGPT) 27 (L) 30 - 65 U/L    AST (SGOT) 16 15 - 37 U/L    Alk. phosphatase 68 50 - 136 U/L    Protein, total 7.3 6.4 - 8.2 g/dL    Albumin 4.0 3.5 - 5.0 g/dL    Globulin 3.3 2.0 - 4.0 g/dL    A-G Ratio 1.2 1.1 - 2.2         IMAGING:  MRI Results (most recent):  No results found for this or any previous visit. Assessment:     Encounter Diagnoses     ICD-10-CM ICD-9-CM   1. RLS (restless legs syndrome)  G25.81 333.94   2. Migraine without aura and without status migrainosus, not intractable  G43.009 346.10   38 y.o. right handed female here for follow up of migraines w/o aura and RLS, previously seen by Dr. Bertha Lay. She reports migraines are presently very well controlled. She was previously maintained on amitriptyline but did not note a significant change in migraines with medication and discontinued.  She was prescribed gabapentin to assist with RLS and finds that this has also helped with her migraines. Unfortunately, she is still exhibiting breakthrough RLS symptoms interfering with sleep several times per week. We discussed titration of gabapentin to 600mg qhs to address these symptoms. She was advised of potential fatigue with medication and was instructed not to use this with other sedating medication including benzodiazepines. Plan:   Increase gabapentin to 600mg qhs for RLS symptoms and migraines  May continue Maxalt 10mg PRN for migraine rescue therapy     Follow-up and Dispositions    Return in about 6 months (around 10/5/2023). I have discussed the diagnosis with the patient today and the intended plan as seen in the above orders with both the patient as well as referring provider and/or PCP via electronic correspondence. The patient has received an after-visit summary and questions were answered concerning future plans. I have discussed medication side effects and warnings with the patient as well.       Signed:  Amelia Perez DO  4/5/2023  3:27 PM

## 2023-09-29 RX ORDER — RIZATRIPTAN BENZOATE 10 MG/1
TABLET ORAL
Qty: 9 TABLET | Refills: 2 | Status: SHIPPED | OUTPATIENT
Start: 2023-09-29